# Patient Record
Sex: MALE | Race: OTHER
[De-identification: names, ages, dates, MRNs, and addresses within clinical notes are randomized per-mention and may not be internally consistent; named-entity substitution may affect disease eponyms.]

---

## 2019-06-29 ENCOUNTER — TRANSCRIPTION ENCOUNTER (OUTPATIENT)
Age: 34
End: 2019-06-29

## 2023-03-01 PROBLEM — Z00.00 ENCOUNTER FOR PREVENTIVE HEALTH EXAMINATION: Status: ACTIVE | Noted: 2023-03-01

## 2023-03-23 ENCOUNTER — APPOINTMENT (OUTPATIENT)
Dept: PAIN MANAGEMENT | Facility: CLINIC | Age: 38
End: 2023-03-23

## 2024-03-14 ENCOUNTER — EMERGENCY (EMERGENCY)
Facility: HOSPITAL | Age: 39
LOS: 0 days | Discharge: HOME | End: 2024-03-15
Attending: EMERGENCY MEDICINE
Payer: COMMERCIAL

## 2024-03-14 VITALS
OXYGEN SATURATION: 100 % | DIASTOLIC BLOOD PRESSURE: 84 MMHG | HEIGHT: 68 IN | SYSTOLIC BLOOD PRESSURE: 157 MMHG | RESPIRATION RATE: 18 BRPM | HEART RATE: 104 BPM | WEIGHT: 149.91 LBS | TEMPERATURE: 98 F

## 2024-03-14 DIAGNOSIS — R30.0 DYSURIA: ICD-10-CM

## 2024-03-14 DIAGNOSIS — F41.9 ANXIETY DISORDER, UNSPECIFIED: ICD-10-CM

## 2024-03-14 DIAGNOSIS — F32.A DEPRESSION, UNSPECIFIED: ICD-10-CM

## 2024-03-14 DIAGNOSIS — R35.0 FREQUENCY OF MICTURITION: ICD-10-CM

## 2024-03-14 PROCEDURE — 87591 N.GONORRHOEAE DNA AMP PROB: CPT

## 2024-03-14 PROCEDURE — 87491 CHLMYD TRACH DNA AMP PROBE: CPT

## 2024-03-14 PROCEDURE — 87086 URINE CULTURE/COLONY COUNT: CPT

## 2024-03-14 PROCEDURE — 99283 EMERGENCY DEPT VISIT LOW MDM: CPT

## 2024-03-14 PROCEDURE — 81003 URINALYSIS AUTO W/O SCOPE: CPT

## 2024-03-14 PROCEDURE — 99284 EMERGENCY DEPT VISIT MOD MDM: CPT

## 2024-03-14 NOTE — ED ADULT TRIAGE NOTE - CHIEF COMPLAINT QUOTE
Pt c/o "bubbles in his urine" for a month. Pt states he has been going to bathroom less in the past week and c/o slight burning when peeing

## 2024-03-15 VITALS — HEART RATE: 82 BPM

## 2024-03-15 LAB
APPEARANCE UR: CLEAR — SIGNIFICANT CHANGE UP
BILIRUB UR-MCNC: NEGATIVE — SIGNIFICANT CHANGE UP
COLOR SPEC: YELLOW — SIGNIFICANT CHANGE UP
DIFF PNL FLD: NEGATIVE — SIGNIFICANT CHANGE UP
GLUCOSE UR QL: NEGATIVE MG/DL — SIGNIFICANT CHANGE UP
KETONES UR-MCNC: NEGATIVE MG/DL — SIGNIFICANT CHANGE UP
LEUKOCYTE ESTERASE UR-ACNC: NEGATIVE — SIGNIFICANT CHANGE UP
NITRITE UR-MCNC: NEGATIVE — SIGNIFICANT CHANGE UP
PH UR: 7 — SIGNIFICANT CHANGE UP (ref 5–8)
PROT UR-MCNC: NEGATIVE MG/DL — SIGNIFICANT CHANGE UP
SP GR SPEC: 1.01 — SIGNIFICANT CHANGE UP (ref 1–1.03)
SPECIMEN SOURCE: SIGNIFICANT CHANGE UP
UROBILINOGEN FLD QL: 0.2 MG/DL — SIGNIFICANT CHANGE UP (ref 0.2–1)

## 2024-03-15 NOTE — ED PROVIDER NOTE - NSFOLLOWUPINSTRUCTIONS_ED_ALL_ED_FT
Our Emergency Department Referral Coordinators will be reaching out to you in the next 24-48 hours from 9:00am to 5:00pm with a follow up appointment. Please expect a phone call from the hospital in that time frame. If you do not receive a call or if you have any questions or concerns, you can reach them at   [phone]      Dysuria    Dysuria is pain or discomfort while urinating. The pain or discomfort may be felt in the tube that carries urine out of the bladder (urethra) or in the surrounding tissue of the genitals. The pain may also be felt in the groin area, lower abdomen, and lower back. You may have to urinate frequently or have the sudden feeling that you have to urinate (urgency). Dysuria can affect both men and women, but is more common in women.    Dysuria can be caused by many different things, including:    Urinary tract infection in women.  Infection of the kidney or bladder.  Kidney stones or bladder stones.  Certain sexually transmitted infections (STIs), such as chlamydia.  Dehydration.  Inflammation of the vagina.  Use of certain medicines.  Use of certain soaps or scented products that cause irritation.    HOME CARE INSTRUCTIONS  Watch your dysuria for any changes. The following actions may help to reduce any discomfort you are feeling:    Drink enough fluid to keep your urine clear or pale yellow.  Empty your bladder often. Avoid holding urine for long periods of time.  After a bowel movement or urination, women should cleanse from front to back, using each tissue only once.  Empty your bladder after sexual intercourse.  Take medicines only as directed by your health care provider.  If you were prescribed an antibiotic medicine, finish it all even if you start to feel better.  Avoid caffeine, tea, and alcohol. They can irritate the bladder and make dysuria worse. In men, alcohol may irritate the prostate.  Keep all follow-up visits as directed by your health care provider. This is important.  If you had any tests done to find the cause of dysuria, it is your responsibility to obtain your test results. Ask the lab or department performing the test when and how you will get your results. Talk with your health care provider if you have any questions about your results.    SEEK MEDICAL CARE IF:  You develop pain in your back or sides.  You have a fever.  You have nausea or vomiting.  You have blood in your urine.  You are not urinating as often as you usually do.    SEEK IMMEDIATE MEDICAL CARE IF:  You pain is severe and not relieved with medicines.  You are unable to hold down any fluids.  You or someone else notices a change in your mental function.  You have a rapid heartbeat at rest.  You have shaking or chills.  You feel extremely weak.    ADDITIONAL NOTES AND INSTRUCTIONS    Please follow up with your Primary MD in 24-48 hr.  Seek immediate medical care for any new/worsening signs or symptoms.

## 2024-03-15 NOTE — ED PROVIDER NOTE - OBJECTIVE STATEMENT
38 yold male to ED Pmhx depression, anxiety c/o urinary discomfort, frequency and hesitancy x 2 days but pt with "bubbles" in urine x 3 weeks; pt not sexually active x 1.5 yrs; denies fever, chills, back pain, n/v or rectal pain; pt denies hx sti or unprotected sexual activity;

## 2024-03-15 NOTE — ED PROVIDER NOTE - PATIENT PORTAL LINK FT
You can access the FollowMyHealth Patient Portal offered by [facility] by registering at the following website: [email or web address] By joining [facility] FollowMyHealth portal, you will also be able to view your health information using other applications (apps) compatible with our system.

## 2024-03-15 NOTE — ED PROVIDER NOTE - CLINICAL SUMMARY MEDICAL DECISION MAKING FREE TEXT BOX
38 y.o. male, PMD of depression, anxiety c/o urinary discomfort, frequency and hesitancy x 2 days. Pt is not sexually active x 1.5 yrs. Denies fever, chills, back pain, n/v or rectal pain. Pt denies hx of STD or unprotected sexual activity. On exam, pt in NAD, AAOx3, head NC/AT, CN II-XII intact, PEERL, EOMi, neck (-) midline tenderness, lungs CTA B/L, CV S1S2 regular, abdomen soft/NT/ND/(+)BS, ext (-) edema, motor 5/5x4, sensation intact, ambulating with steady gait. UA (-). Will d/c with urology follow up. Return precautions provided.

## 2024-03-15 NOTE — ED PROVIDER NOTE - CHIEF COMPLAINT
self-care/home management/community/leisure
The patient is a 38y Male complaining of urinary symptoms.

## 2024-03-16 LAB
C TRACH RRNA SPEC QL NAA+PROBE: SIGNIFICANT CHANGE UP
CULTURE RESULTS: NO GROWTH — SIGNIFICANT CHANGE UP
N GONORRHOEA RRNA SPEC QL NAA+PROBE: SIGNIFICANT CHANGE UP
SPECIMEN SOURCE: SIGNIFICANT CHANGE UP